# Patient Record
Sex: FEMALE | Race: WHITE | Employment: OTHER | URBAN - METROPOLITAN AREA
[De-identification: names, ages, dates, MRNs, and addresses within clinical notes are randomized per-mention and may not be internally consistent; named-entity substitution may affect disease eponyms.]

---

## 2021-07-28 ENCOUNTER — HOSPITAL ENCOUNTER (EMERGENCY)
Age: 81
Discharge: HOME | End: 2021-07-29
Attending: EMERGENCY MEDICINE
Payer: MEDICARE

## 2021-07-28 ENCOUNTER — APPOINTMENT (OUTPATIENT)
Dept: GENERAL RADIOLOGY | Age: 81
End: 2021-07-28
Payer: MEDICARE

## 2021-07-28 VITALS
DIASTOLIC BLOOD PRESSURE: 88 MMHG | TEMPERATURE: 98.7 F | SYSTOLIC BLOOD PRESSURE: 145 MMHG | RESPIRATION RATE: 18 BRPM | HEART RATE: 80 BPM | OXYGEN SATURATION: 99 %

## 2021-07-28 DIAGNOSIS — S52.502A CLOSED FRACTURE OF DISTAL END OF LEFT RADIUS, UNSPECIFIED FRACTURE MORPHOLOGY, INITIAL ENCOUNTER: Primary | ICD-10-CM

## 2021-07-28 PROCEDURE — 99284 EMERGENCY DEPT VISIT MOD MDM: CPT

## 2021-07-28 PROCEDURE — 6370000000 HC RX 637 (ALT 250 FOR IP): Performed by: STUDENT IN AN ORGANIZED HEALTH CARE EDUCATION/TRAINING PROGRAM

## 2021-07-28 PROCEDURE — 25605 CLTX DST RDL FX/EPHYS SEP W/: CPT

## 2021-07-28 PROCEDURE — 73110 X-RAY EXAM OF WRIST: CPT

## 2021-07-28 RX ORDER — OXYCODONE HYDROCHLORIDE AND ACETAMINOPHEN 5; 325 MG/1; MG/1
1 TABLET ORAL ONCE
Status: COMPLETED | OUTPATIENT
Start: 2021-07-28 | End: 2021-07-28

## 2021-07-28 RX ORDER — LIDOCAINE HYDROCHLORIDE AND EPINEPHRINE 10; 10 MG/ML; UG/ML
20 INJECTION, SOLUTION INFILTRATION; PERINEURAL ONCE
Status: COMPLETED | OUTPATIENT
Start: 2021-07-28 | End: 2021-07-29

## 2021-07-28 RX ADMIN — OXYCODONE HYDROCHLORIDE AND ACETAMINOPHEN 1 TABLET: 5; 325 TABLET ORAL at 23:53

## 2021-07-28 ASSESSMENT — PAIN SCALES - GENERAL: PAINLEVEL_OUTOF10: 10

## 2021-07-28 ASSESSMENT — PAIN DESCRIPTION - ORIENTATION: ORIENTATION: LEFT

## 2021-07-28 ASSESSMENT — PAIN DESCRIPTION - LOCATION: LOCATION: WRIST

## 2021-07-29 ENCOUNTER — APPOINTMENT (OUTPATIENT)
Dept: GENERAL RADIOLOGY | Age: 81
End: 2021-07-29
Payer: MEDICARE

## 2021-07-29 PROCEDURE — 2500000003 HC RX 250 WO HCPCS: Performed by: STUDENT IN AN ORGANIZED HEALTH CARE EDUCATION/TRAINING PROGRAM

## 2021-07-29 PROCEDURE — 73110 X-RAY EXAM OF WRIST: CPT

## 2021-07-29 RX ORDER — TRAMADOL HYDROCHLORIDE 50 MG/1
50 TABLET ORAL EVERY 6 HOURS PRN
Qty: 12 TABLET | Refills: 0 | Status: SHIPPED | OUTPATIENT
Start: 2021-07-29 | End: 2021-08-01

## 2021-07-29 RX ADMIN — LIDOCAINE HYDROCHLORIDE,EPINEPHRINE BITARTRATE 20 ML: 10; .01 INJECTION, SOLUTION INFILTRATION; PERINEURAL at 00:01

## 2021-07-29 ASSESSMENT — ENCOUNTER SYMPTOMS
NAUSEA: 0
COLOR CHANGE: 1
RESPIRATORY NEGATIVE: 1
ABDOMINAL PAIN: 0
VOMITING: 0
CHEST TIGHTNESS: 0
SHORTNESS OF BREATH: 0
BACK PAIN: 0
EYES NEGATIVE: 1

## 2021-07-29 NOTE — ED TRIAGE NOTES
Patient fell from standing yesterday injuring left wrist.  reports slipping to the floor denies dizziness and LOC

## 2021-07-29 NOTE — ED PROVIDER NOTES
[id number] RESIDENT NOTE       Date of evaluation: 7/28/2021    Chief Complaint     Wrist Pain      of Present Illness     Jaun Vazquez is a 80 y.o. female, history of complete heart block, status post pacemaker, hypothyroidism, fibromyalgia, presenting to the emergency department for evaluation of left wrist pain. The patient reports a mechanical slip and fall while at home yesterday, landing on her outstretched left hand. She immediately experienced pain in left wrist, and has been managing her pain at home with ibuprofen and Tylenol. The patient was at [place] [address] today just prior to arrival, where she was attempting to purchase a wrist splint. The pharmacist that [facility] contacted EMS for the patient to be transported to the emergency department for evaluation secondary to a gross deformity of the left wrist and overlying skin erythema. Currently, the patient rates her discomfort as 6 out of 10 in severity, with movement of the left wrist and hand exacerbating her discomfort. She denies any numbness or weakness in the left hand or fingers of the left hand. She denies any preceding chest pain, shortness of breath, lightheadedness, headaches or loss of consciousness preceding her fall. She has otherwise been in her normal state of health. Review of Systems   A complete review of systems was conducted on this patient and was negative except as noted in the HPI. Review of Systems   Constitutional: Negative. HENT: Negative. Eyes: Negative. Respiratory: Negative. Negative for chest tightness and shortness of breath. Cardiovascular: Negative for chest pain. Gastrointestinal: Negative for abdominal pain, nausea and vomiting. Endocrine: Negative for cold intolerance and heat intolerance. Genitourinary: Negative for dysuria, hematuria and urgency. Musculoskeletal: Positive for joint swelling.  Negative for arthralgias, back pain, gait problem, myalgias, neck pain and neck stiffness. Skin: Positive for color change. Neurological: Negative for dizziness, weakness, light-headedness, numbness and headaches. Past Medical, Surgical, Family, and Social History     She has a past medical history of Atrioventricular block, complete ([address] Cardiac pacemaker in situ, Other malaise and fatigue, and Unspecified cardiovascular disease. She has no past surgical history on file. Her family history includes Cancer in her brother; Heart Disease in her father. She reports that she quit smoking about 21 years ago. She has never used smokeless tobacco.    Medications     Previous Medications    ALPRAZOLAM (XANAX) 0.25 MG TABLET    Take 1 tablet by mouth 3 times daily as needed for Sleep. ASPIRIN 81 MG EC TABLET    Take 81 mg by mouth daily. CARBAMAZEPINE (TEGRETOL) 100 MG CHEWABLE TABLET    Take 1 tablet by mouth 2 times daily. CLIDINIUM-CHLORDIAZEPOXIDE (LIBRAX) 2.5-5 MG PER CAPSULE    TAKE 1 CAPSULE BY MOUTH 4 TIMES DAILY (BEFORE MEALS AND NIGHTLY). CYANOCOBALAMIN (COBAL-1000) 1000 MCG/ML INJECTION    Inject 1 mL into the muscle once for 1 dose. CYANOCOBALAMIN 1000 MCG/ML INJECTION    Inject 1 mL into the muscle once for 1 dose. Inject 1000 mcg every month. FERROUS GLUCONATE (FERGON) 324 (38 FE) MG TABLET    Take 1 tablet by mouth daily (with breakfast). FOLIC ACID (FOLVITE) 1 MG TABLET    Take 1 tablet by mouth daily. GABAPENTIN (NEURONTIN) 300 MG CAPSULE    TAKE 2 CAPSULES BY MOUTH 3 TIMES A DAY    LEVOTHYROXINE (SYNTHROID) 50 MCG TABLET    TAKE 1 TABLET DAILY ALTERNATING WITH 1 AND 1/2 TABLET DAILY    LISINOPRIL (PRINIVIL;ZESTRIL) 20 MG TABLET    TAKE 1/2 TABLET BY MOUTH EVERY DAY    MAGNESIUM OXIDE (MAG-OX) 400 MG TABLET    Take 400 mg by mouth daily. PENTOXIFYLLINE (TRENTAL) 400 MG CR TABLET    Take 1 tablet by mouth three times daily.     TUBERCULIN SYR 1CC/27GX1/2\" (MONOJECT 1CC TB SYR 27GX1/2\") 27G X 1/2\" 1 ML MISC by Does not apply route. Use as needed for B12 injections       Allergies     She is allergic to nicergoline and prednisone. Physical Exam     INITIAL VITALS: BP: (!) 145/88, Temp: 98.7 °F (37.1 °C), Pulse: 80, Resp: 18, SpO2: 99 %   Physical Exam  Vitals and nursing note reviewed. Constitutional:       General: She is not in acute distress. Appearance: Normal appearance. She is normal weight. She is not ill-appearing or toxic-appearing. HENT:      Head: Normocephalic and atraumatic. Nose: Nose normal.      Mouth/Throat:      Mouth: Mucous membranes are moist.   Eyes:      Extraocular Movements: Extraocular movements intact. Conjunctiva/sclera: Conjunctivae normal.      Pupils: Pupils are equal, round, and reactive to light. Cardiovascular:      Rate and Rhythm: Normal rate and regular rhythm. Pulses: Normal pulses. Pulmonary:      Effort: Pulmonary effort is normal.      Breath sounds: Normal breath sounds. Abdominal:      General: Abdomen is flat. There is no distension. Palpations: Abdomen is soft. Tenderness: There is no abdominal tenderness. Musculoskeletal:         General: Swelling, tenderness, deformity (Left wrist) and signs of injury present. Cervical back: Normal range of motion and neck supple. Lymphadenopathy:      Cervical: No cervical adenopathy. Skin:     General: Skin is warm and dry. Capillary Refill: Capillary refill takes 2 to 3 seconds. Findings: Bruising (over left wrist) present. Neurological:      General: No focal deficit present. Mental Status: She is alert. Psychiatric:         Mood and Affect: Mood normal.         Behavior: Behavior normal.           DiagnosticResults     RADIOLOGY:  XR WRIST LEFT (MIN 3 VIEWS)   Final Result   Stable alignment of distal right radius fracture, now in a splint. XR WRIST LEFT (MIN 3 VIEWS)   Final Result   Comminuted distal radius fracture.           LABS:   No results found for this visit on 07/28/21. RECENT VITALS:  BP: (!) 145/88, Temp: 98.7 °F (37.1 °C), Pulse: 80,Resp: 18, SpO2: 99 %     Procedures     Ortho Injury    Date/Time: 7/29/2021 2:07 AM  Performed by: Angela Posey MD  Authorized by: Dorina Krabbe, MD   Consent: Verbal consent obtained. Written consent not obtained. Risks and benefits: risks, benefits and alternatives were discussed  Consent given by: patient  Imaging studies: imaging studies available  Injury location: forearm  Location details: left forearm  Injury type: fracture  Fracture type: distal radius  Pre-procedure neurovascular assessment: neurovascularly intact  Pre-procedure distal perfusion: normal  Pre-procedure neurological function: normal  Pre-procedure range of motion: reduced  Anesthesia: hematoma block    Anesthesia:  Local anesthesia used: yes  Local Anesthetic: lidocaine 1% with epinephrine  Anesthetic total: 10 mL    Sedation:  Patient sedated: no    Manipulation performed: yes  Skin traction used: yes  Reduction successful: no  X-ray confirmed reduction: yes  Immobilization: splint  Splint type: sugar tong  Supplies used: Ortho-Glass  Post-procedure neurovascular assessment: post-procedure neurovascularly intact  Post-procedure distal perfusion: normal  Post-procedure neurological function: normal  Post-procedure range of motion: unchanged  Patient tolerance: patient tolerated the procedure well with no immediate complications          ED Course     Nursing Notes, Past Medical Hx, Past Surgical Hx, Social Hx, Allergies, and Family Hx were reviewed. The patient was given the followingmedications:  Orders Placed This Encounter   Medications    oxyCODONE-acetaminophen (PERCOCET) 5-325 MG per tablet 1 tablet    lidocaine-EPINEPHrine 1 %-1:452009 injection 20 mL    traMADol (ULTRAM) 50 MG tablet     Sig: Take 1 tablet by mouth every 6 hours as needed for Pain for up to 3 days. Intended supply: 3 days.  Take lowest dose possible to manage pain [id number]  [phone]    As needed, If symptoms worsen      DISCHARGE MEDICATIONS:  New Prescriptions    TRAMADOL (ULTRAM) 50 MG TABLET    Take 1 tablet by mouth every 6 hours as needed for Pain for up to 3 days. Intended supply: 3 days.  Take lowest dose possible to manage pain       DISPOSITION Decision To Discharge 07/29/2021 01:33:20 AM        Fazal Soriano MD  Resident  07/29/21 [id number]

## 2021-07-29 NOTE — ED PROVIDER NOTES
ED Attending Attestation Note     Date of evaluation: 7/28/2021    This patient was seen by the resident. I have seen and examined the patient, agree with the workup, evaluation, management and diagnosis. The care plan has been discussed. My assessment reveals complaints of left wrist pain after mechanical fall greater than 24 hours ago. Patient does have an obvious deformity to the wrist with evidence of fracture on x-ray. I was present for the closed reduction.      Venkatesh Narvaez MD  07/29/21 [id number]

## 2025-04-02 ENCOUNTER — OFFICE VISIT (OUTPATIENT)
Dept: ORTHOPEDIC SURGERY | Age: 85
End: 2025-04-02
Payer: MEDICARE

## 2025-04-02 VITALS — RESPIRATION RATE: 14 BRPM | BODY MASS INDEX: 31.25 KG/M2 | HEIGHT: 59 IN | WEIGHT: 155 LBS

## 2025-04-02 DIAGNOSIS — M65.319 TRIGGER THUMB, ACQUIRED: Primary | ICD-10-CM

## 2025-04-02 PROCEDURE — 1125F AMNT PAIN NOTED PAIN PRSNT: CPT | Performed by: ORTHOPAEDIC SURGERY

## 2025-04-02 PROCEDURE — 1159F MED LIST DOCD IN RCRD: CPT | Performed by: ORTHOPAEDIC SURGERY

## 2025-04-02 PROCEDURE — 99203 OFFICE O/P NEW LOW 30 MIN: CPT | Performed by: ORTHOPAEDIC SURGERY

## 2025-04-02 PROCEDURE — 1123F ACP DISCUSS/DSCN MKR DOCD: CPT | Performed by: ORTHOPAEDIC SURGERY

## 2025-04-02 PROCEDURE — 20550 NJX 1 TENDON SHEATH/LIGAMENT: CPT | Performed by: ORTHOPAEDIC SURGERY

## 2025-04-02 RX ORDER — LIDOCAINE HYDROCHLORIDE 10 MG/ML
0.5 INJECTION, SOLUTION INFILTRATION; PERINEURAL ONCE
Status: COMPLETED | OUTPATIENT
Start: 2025-04-02 | End: 2025-04-02

## 2025-04-02 RX ORDER — TRIAMCINOLONE ACETONIDE 40 MG/ML
20 INJECTION, SUSPENSION INTRA-ARTICULAR; INTRAMUSCULAR ONCE
Status: COMPLETED | OUTPATIENT
Start: 2025-04-02 | End: 2025-04-02

## 2025-04-02 RX ADMIN — LIDOCAINE HYDROCHLORIDE 0.5 ML: 10 INJECTION, SOLUTION INFILTRATION; PERINEURAL at 15:10

## 2025-04-02 RX ADMIN — TRIAMCINOLONE ACETONIDE 20 MG: 40 INJECTION, SUSPENSION INTRA-ARTICULAR; INTRAMUSCULAR at 15:11

## 2025-04-02 NOTE — PROGRESS NOTES
This 84 y.o., right hand dominant retired woman is seen in referral for Luis Miguel Sharif MD  with a chief complaint of pain, swelling, stiffness and intermittant snapping of the right thumb.  Symptoms have been present for 3 weeks.  The digit is stiff, especially in the morning and will frequently stick in the palm when flexed and pop when extended.  This is often associated with pain.  Mild swelling has been noticed.  The patient denies discoloration or history of injury or overuse.  Treatment has not been prescribed.  The problem has worsened recently.  The pain assessment has been reviewed and is correct as stated..    The patient's social history, past medical history, family history, medications, allergies and review of systems, entered 4/2/25, have been reviewed, and dated and are recorded in the chart.    On examination the patient is Height: 150.5 cm (4' 11.25\") tall and weighs Weight - Scale: 70.3 kg (155 lb). Respirations are 18 per minute.  The patient is well nourished, is oriented to time and place, demonstrates appropriate mood and affect as well as normal gait and station.  There is mild soft tissue swelling of the digit.  There is no deformity. There is tenderness, thickening and nodularity at the base of the flexor tendon sheath.  Range of motion is slightly limited in flexion and extension.  The digit sticks in flexion and pops into extension, accompanied by some pain. Skin is intact without lesions.  Distal circulation and sensation are intact.  Muscle strength and coordination are normal.  Reflexes are present bilaterally.  Joints are stable.  There are no subcutaneous nodules or enlarged epitrochlear lymph nodes.    I have personally reviewed and interpreted all previous external imaging studies, laboratory tests, diagnostic proceedures and medical encounters pertinent to this patient's visit today.    Impression: Right thumb trigger digit.     The nature of this medical problem is fully discussed